# Patient Record
Sex: FEMALE | Race: WHITE | NOT HISPANIC OR LATINO | URBAN - METROPOLITAN AREA
[De-identification: names, ages, dates, MRNs, and addresses within clinical notes are randomized per-mention and may not be internally consistent; named-entity substitution may affect disease eponyms.]

---

## 2017-09-29 PROBLEM — L65.9 HAIR LOSS: Status: ACTIVE | Noted: 2017-09-29

## 2017-09-29 PROBLEM — E53.8 B12 DEFICIENCY: Status: ACTIVE | Noted: 2017-09-29

## 2017-09-29 PROBLEM — N94.6 DYSMENORRHEA: Status: ACTIVE | Noted: 2017-09-29

## 2017-09-29 PROBLEM — E03.9 HYPOTHYROIDISM, UNSPECIFIED TYPE: Status: ACTIVE | Noted: 2017-09-29

## 2017-09-29 PROBLEM — D50.9 IRON DEFICIENCY ANEMIA, UNSPECIFIED IRON DEFICIENCY ANEMIA TYPE: Status: ACTIVE | Noted: 2017-09-29

## 2017-09-29 PROCEDURE — 82607 VITAMIN B-12: CPT

## 2017-09-29 PROCEDURE — 84480 ASSAY TRIIODOTHYRONINE (T3): CPT

## 2017-09-29 PROCEDURE — 82746 ASSAY OF FOLIC ACID SERUM: CPT

## 2018-03-16 PROBLEM — G44.209 TENSION HEADACHE: Status: ACTIVE | Noted: 2018-03-16

## 2018-03-16 PROBLEM — R05.9 COUGH: Status: ACTIVE | Noted: 2018-03-16

## 2018-03-16 PROBLEM — J01.00 ACUTE NON-RECURRENT MAXILLARY SINUSITIS: Status: ACTIVE | Noted: 2018-03-16

## 2018-03-16 PROBLEM — H92.09 EARACHE: Status: ACTIVE | Noted: 2018-03-16

## 2018-03-16 PROBLEM — H10.33 ACUTE CONJUNCTIVITIS OF BOTH EYES, UNSPECIFIED ACUTE CONJUNCTIVITIS TYPE: Status: ACTIVE | Noted: 2018-03-16

## 2018-04-25 PROBLEM — M53.3 SACROILIAC JOINT DYSFUNCTION: Status: ACTIVE | Noted: 2018-04-25

## 2018-07-07 PROBLEM — R51.9 DAILY HEADACHE: Status: ACTIVE | Noted: 2018-07-07

## 2018-07-07 PROBLEM — F07.81 POSTCONCUSSION SYNDROME: Status: ACTIVE | Noted: 2018-07-07

## 2018-08-31 PROCEDURE — 88175 CYTOPATH C/V AUTO FLUID REDO: CPT | Performed by: OBSTETRICS & GYNECOLOGY

## 2018-09-10 PROCEDURE — 87086 URINE CULTURE/COLONY COUNT: CPT | Performed by: FAMILY MEDICINE

## 2019-03-26 ENCOUNTER — WALK IN (OUTPATIENT)
Dept: URGENT CARE | Age: 24
End: 2019-03-26

## 2019-04-16 ENCOUNTER — APPOINTMENT (OUTPATIENT)
Dept: OTHER | Facility: HOSPITAL | Age: 24
End: 2019-04-16
Attending: PREVENTIVE MEDICINE

## 2020-11-02 ENCOUNTER — APPOINTMENT (OUTPATIENT)
Dept: OTHER | Facility: HOSPITAL | Age: 25
End: 2020-11-02
Attending: PREVENTIVE MEDICINE

## 2021-06-08 ENCOUNTER — OFFICE VISIT (OUTPATIENT)
Dept: OTHER | Facility: HOSPITAL | Age: 26
End: 2021-06-08
Attending: ORTHOPAEDIC SURGERY

## 2021-06-08 DIAGNOSIS — Z00.00 ROUTINE GENERAL MEDICAL EXAMINATION AT A HEALTH CARE FACILITY: Primary | ICD-10-CM

## 2021-06-08 PROCEDURE — 87389 HIV-1 AG W/HIV-1&-2 AB AG IA: CPT

## 2021-06-08 PROCEDURE — 86803 HEPATITIS C AB TEST: CPT

## 2021-06-08 PROCEDURE — 86706 HEP B SURFACE ANTIBODY: CPT

## 2021-08-03 ENCOUNTER — OFFICE VISIT (OUTPATIENT)
Dept: OBGYN CLINIC | Facility: CLINIC | Age: 26
End: 2021-08-03
Payer: COMMERCIAL

## 2021-08-03 DIAGNOSIS — Z01.419 ENCOUNTER FOR WELL WOMAN EXAM: Primary | ICD-10-CM

## 2021-08-03 DIAGNOSIS — Z11.3 ROUTINE SCREENING FOR STI (SEXUALLY TRANSMITTED INFECTION): ICD-10-CM

## 2021-08-03 DIAGNOSIS — Z30.41 ENCOUNTER FOR BIRTH CONTROL PILLS MAINTENANCE: ICD-10-CM

## 2021-08-03 PROCEDURE — 99385 PREV VISIT NEW AGE 18-39: CPT | Performed by: NURSE PRACTITIONER

## 2021-08-03 RX ORDER — VITAMIN B COMPLEX
CAPSULE ORAL
COMMUNITY

## 2021-08-03 RX ORDER — LEVONORGESTREL AND ETHINYL ESTRADIOL AND ETHINYL ESTRADIOL 150-30(84)
1 KIT ORAL DAILY
COMMUNITY
Start: 2021-06-24 | End: 2021-08-03

## 2021-08-03 RX ORDER — FERROUS SULFATE 325(65) MG
325 TABLET ORAL 2 TIMES DAILY
COMMUNITY
Start: 2020-10-15

## 2021-08-03 RX ORDER — LEVONORGESTREL AND ETHINYL ESTRADIOL AND ETHINYL ESTRADIOL 150-30(84)
1 KIT ORAL DAILY
Qty: 91 TABLET | Refills: 3 | Status: SHIPPED | OUTPATIENT
Start: 2021-08-03

## 2021-08-03 RX ORDER — CALCIUM/MAGNESIUM/ZINC 333-133 MG
TABLET ORAL
COMMUNITY

## 2021-08-03 NOTE — PROGRESS NOTES
[address] [facility]  Obstetrics and Gynecology  Annual Gynecology Visit  Amarjit Cooper, MSN, APRN, FNP-BC    HPI   Tigist Monday is a 32year old female who presents for her annual gynecology exam and birth control refills. She has no complaints.  Menses are Other (Cervical Cancer) Mother    • Macular degeneration Maternal Aunt    • Retinal detachment Maternal Aunt    • Diabetes Paternal Grandmother       Social History:   Social History    Tobacco Use      Smoking status: Never Smoker      Smokeless tobacco: tenderness, bleeding or lesions. Cervix: Friability present. No cervical motion tenderness, discharge or lesion. Uterus: Normal. Not enlarged and not tender. Adnexa:         Right: No mass, tenderness or fullness.           Left: No mass, te HCV Antibody      Hepatitis B Surface Antigen      HIV Ag/Ab Combo      T Pallidum Screening [place]      ThinPrep PAP with HPV Reflex Request B      Chlamydia/GC PCR Combo      Genital vaginosis screen      PRESCRIPTIONS     Requested Prescriptions

## 2021-08-05 ENCOUNTER — LAB ENCOUNTER (OUTPATIENT)
Dept: LAB | Age: 26
End: 2021-08-05
Attending: NURSE PRACTITIONER
Payer: COMMERCIAL

## 2021-08-05 DIAGNOSIS — Z11.3 ROUTINE SCREENING FOR STI (SEXUALLY TRANSMITTED INFECTION): ICD-10-CM

## 2021-08-05 LAB
C TRACH DNA SPEC QL NAA+PROBE: NEGATIVE
GENITAL VAGINOSIS SCREEN: NEGATIVE
HBV SURFACE AG SER-ACNC: 0.12 [IU]/L
HBV SURFACE AG SERPL QL IA: NONREACTIVE
HCV AB SERPL QL IA: NONREACTIVE
N GONORRHOEA DNA SPEC QL NAA+PROBE: NEGATIVE
TRICHOMONAS SCREEN: NEGATIVE

## 2021-08-05 PROCEDURE — 36415 COLL VENOUS BLD VENIPUNCTURE: CPT

## 2021-08-05 PROCEDURE — 86780 TREPONEMA PALLIDUM: CPT

## 2021-08-05 PROCEDURE — 87389 HIV-1 AG W/HIV-1&-2 AB AG IA: CPT

## 2021-08-05 PROCEDURE — 87340 HEPATITIS B SURFACE AG IA: CPT

## 2021-08-05 PROCEDURE — 86803 HEPATITIS C AB TEST: CPT

## 2021-08-06 LAB — T PALLIDUM AB SER QL: NEGATIVE

## 2021-08-09 LAB — LAST PAP RESULT: NORMAL

## 2021-12-09 ENCOUNTER — IMMUNIZATION (OUTPATIENT)
Dept: LAB | Facility: HOSPITAL | Age: 26
End: 2021-12-09
Attending: EMERGENCY MEDICINE
Payer: COMMERCIAL

## 2021-12-09 DIAGNOSIS — Z23 NEED FOR VACCINATION: Primary | ICD-10-CM

## 2021-12-09 PROCEDURE — 0004A SARSCOV2 VAC 30MCG/0.3ML IM: CPT

## 2021-12-16 ENCOUNTER — LAB ENCOUNTER (OUTPATIENT)
Dept: LAB | Facility: HOSPITAL | Age: 26
End: 2021-12-16
Attending: PREVENTIVE MEDICINE
Payer: COMMERCIAL

## 2021-12-16 ENCOUNTER — TELEPHONE (OUTPATIENT)
Dept: INTERNAL MEDICINE CLINIC | Facility: HOSPITAL | Age: 26
End: 2021-12-16

## 2021-12-16 DIAGNOSIS — Z20.822 SUSPECTED COVID-19 VIRUS INFECTION: Primary | ICD-10-CM

## 2021-12-16 DIAGNOSIS — Z20.822 SUSPECTED COVID-19 VIRUS INFECTION: ICD-10-CM

## 2021-12-16 NOTE — TELEPHONE ENCOUNTER
Results and RTW guidelines:    COVID RESULT:    [x] Viewed by employee in [employer]. RTW plan and instructions as indicated on triage call. Manager notified. Estimated RTW date:   [] Discussed with employee   [] Unable to reach by phone.   Sent via [employer] for COVID    5.  Still has a fever, vomiting or diarrhea   - Keep communication open with management about RTW and if symptoms worsen                - If outside testing completed, bring a copy of result to RTW appointment           Notes:     RTW PLAN:

## 2021-12-16 NOTE — TELEPHONE ENCOUNTER
Department: [facility]                               [] [address]  []ERIC   [x] [address] [facility]    Dept Manager/Supervisor/team or clinical lead: Trever Whittaker    Position:  [] MD     [] RN     [] Respiratory Therapist     [] PCT     [] PSR      [] Soy Dockery     [] MA [x] Other: Ph scheduled to work? 12/17/2021    Did you have close contact with someone on your unit while not wearing a mask? (e.g., during meal breaks):  Yes []   No [x]    If yes, who:   Do you share a workspace? Yes []   No [x]       If yes, with whom?    Do you have ordered: [x] Rapid    [] [employer]    Date test is to be taken:  12/16/2021    []  Outside testing       [x] Manager notified    INSTRUCTIONS PROVIDED:    [x] Employee will schedule testing via [employer] or call Central Scheduling at [phone].   Instructed

## 2022-03-16 ENCOUNTER — OFFICE VISIT (OUTPATIENT)
Dept: INTERNAL MEDICINE CLINIC | Facility: CLINIC | Age: 27
End: 2022-03-16
Payer: COMMERCIAL

## 2022-03-16 VITALS
OXYGEN SATURATION: 98 % | HEART RATE: 67 BPM | SYSTOLIC BLOOD PRESSURE: 118 MMHG | HEIGHT: 67 IN | RESPIRATION RATE: 15 BRPM | DIASTOLIC BLOOD PRESSURE: 64 MMHG | WEIGHT: 138 LBS | BODY MASS INDEX: 21.66 KG/M2

## 2022-03-16 DIAGNOSIS — R53.83 OTHER FATIGUE: ICD-10-CM

## 2022-03-16 DIAGNOSIS — R68.89 COLD INTOLERANCE: ICD-10-CM

## 2022-03-16 DIAGNOSIS — E55.9 VITAMIN D INSUFFICIENCY: ICD-10-CM

## 2022-03-16 DIAGNOSIS — F43.21 GRIEF: ICD-10-CM

## 2022-03-16 DIAGNOSIS — Z83.49 FAMILY HISTORY OF THYROID DISEASE: ICD-10-CM

## 2022-03-16 DIAGNOSIS — Z00.00 PREVENTATIVE HEALTH CARE: Primary | ICD-10-CM

## 2022-03-16 PROCEDURE — 3074F SYST BP LT 130 MM HG: CPT | Performed by: FAMILY MEDICINE

## 2022-03-16 PROCEDURE — 3078F DIAST BP <80 MM HG: CPT | Performed by: FAMILY MEDICINE

## 2022-03-16 PROCEDURE — 3008F BODY MASS INDEX DOCD: CPT | Performed by: FAMILY MEDICINE

## 2022-03-16 PROCEDURE — 99385 PREV VISIT NEW AGE 18-39: CPT | Performed by: FAMILY MEDICINE

## 2022-03-19 ENCOUNTER — LAB ENCOUNTER (OUTPATIENT)
Dept: LAB | Age: 27
End: 2022-03-19
Attending: FAMILY MEDICINE
Payer: COMMERCIAL

## 2022-03-19 DIAGNOSIS — E55.9 VITAMIN D INSUFFICIENCY: ICD-10-CM

## 2022-03-19 DIAGNOSIS — R53.83 OTHER FATIGUE: ICD-10-CM

## 2022-03-19 LAB
ANION GAP SERPL CALC-SCNC: 7 MMOL/L (ref 0–18)
BUN BLD-MCNC: 13 MG/DL (ref 7–18)
BUN/CREAT SERPL: 15.1 (ref 10–20)
CALCIUM BLD-MCNC: 9.2 MG/DL (ref 8.5–10.1)
CHLORIDE SERPL-SCNC: 109 MMOL/L (ref 98–112)
CO2 SERPL-SCNC: 25 MMOL/L (ref 21–32)
CREAT BLD-MCNC: 0.86 MG/DL
DEPRECATED RDW RBC AUTO: 43.4 FL (ref 35.1–46.3)
ERYTHROCYTE [DISTWIDTH] IN BLOOD BY AUTOMATED COUNT: 12.5 % (ref 11–15)
GLUCOSE BLD-MCNC: 92 MG/DL (ref 70–99)
HCT VFR BLD AUTO: 41.2 %
HGB BLD-MCNC: 13.6 G/DL
MCH RBC QN AUTO: 31 PG (ref 26–34)
MCHC RBC AUTO-ENTMCNC: 33 G/DL (ref 31–37)
MCV RBC AUTO: 93.8 FL
OSMOLALITY SERPL CALC.SUM OF ELEC: 292 MOSM/KG (ref 275–295)
PLATELET # BLD AUTO: 239 10(3)UL (ref 150–450)
POTASSIUM SERPL-SCNC: 4.2 MMOL/L (ref 3.5–5.1)
RBC # BLD AUTO: 4.39 X10(6)UL
SODIUM SERPL-SCNC: 141 MMOL/L (ref 136–145)
TSI SER-ACNC: 1.05 MIU/ML (ref 0.36–3.74)
VIT D+METAB SERPL-MCNC: 12.2 NG/ML (ref 30–100)
WBC # BLD AUTO: 5.5 X10(3) UL (ref 4–11)

## 2022-03-19 PROCEDURE — 80048 BASIC METABOLIC PNL TOTAL CA: CPT

## 2022-03-19 PROCEDURE — 84443 ASSAY THYROID STIM HORMONE: CPT

## 2022-03-19 PROCEDURE — 85027 COMPLETE CBC AUTOMATED: CPT

## 2022-03-19 PROCEDURE — 36415 COLL VENOUS BLD VENIPUNCTURE: CPT

## 2022-03-19 PROCEDURE — 82306 VITAMIN D 25 HYDROXY: CPT

## 2022-03-26 RX ORDER — ERGOCALCIFEROL 1.25 MG/1
50000 CAPSULE ORAL WEEKLY
Qty: 16 CAPSULE | Refills: 0 | Status: SHIPPED | OUTPATIENT
Start: 2022-03-26 | End: 2022-04-25

## 2022-04-07 ENCOUNTER — NURSE ONLY (OUTPATIENT)
Dept: INTERNAL MEDICINE CLINIC | Facility: CLINIC | Age: 27
End: 2022-04-07
Payer: COMMERCIAL

## 2022-04-07 DIAGNOSIS — Z01.84 IMMUNITY STATUS TESTING: ICD-10-CM

## 2022-04-07 LAB
HBV CORE AB SERPL QL IA: NONREACTIVE
HBV SURFACE AB SER QL: REACTIVE
HBV SURFACE AB SERPL IA-ACNC: 758.52 MIU/ML
HBV SURFACE AG SER-ACNC: <0.1 [IU]/L
HBV SURFACE AG SERPL QL IA: NONREACTIVE
RUBV IGG SER QL: POSITIVE
RUBV IGG SER-ACNC: 235.9 IU/ML (ref 10–?)

## 2022-04-07 PROCEDURE — 86787 VARICELLA-ZOSTER ANTIBODY: CPT | Performed by: FAMILY MEDICINE

## 2022-04-07 PROCEDURE — 87340 HEPATITIS B SURFACE AG IA: CPT | Performed by: FAMILY MEDICINE

## 2022-04-07 PROCEDURE — 80503 PATH CLIN CONSLTJ SF 5-20: CPT | Performed by: FAMILY MEDICINE

## 2022-04-07 PROCEDURE — 86706 HEP B SURFACE ANTIBODY: CPT | Performed by: FAMILY MEDICINE

## 2022-04-07 PROCEDURE — 86735 MUMPS ANTIBODY: CPT | Performed by: FAMILY MEDICINE

## 2022-04-07 PROCEDURE — 86704 HEP B CORE ANTIBODY TOTAL: CPT | Performed by: FAMILY MEDICINE

## 2022-04-07 PROCEDURE — 86765 RUBEOLA ANTIBODY: CPT | Performed by: FAMILY MEDICINE

## 2022-04-07 PROCEDURE — 86762 RUBELLA ANTIBODY: CPT | Performed by: FAMILY MEDICINE

## 2022-04-08 LAB
MEV IGG SER-ACNC: >300 AU/ML (ref 16.5–?)
MUV IGG SER IA-ACNC: 75.9 AU/ML (ref 11–?)
VZV IGG SER IA-ACNC: 773.3 (ref 165–?)

## 2022-04-15 ENCOUNTER — APPOINTMENT (OUTPATIENT)
Dept: GENERAL RADIOLOGY | Age: 27
End: 2022-04-15
Attending: NURSE PRACTITIONER
Payer: COMMERCIAL

## 2022-04-15 ENCOUNTER — HOSPITAL ENCOUNTER (OUTPATIENT)
Age: 27
Discharge: HOME | End: 2022-04-15
Payer: COMMERCIAL

## 2022-04-15 VITALS
TEMPERATURE: 99 F | SYSTOLIC BLOOD PRESSURE: 138 MMHG | HEART RATE: 97 BPM | RESPIRATION RATE: 22 BRPM | DIASTOLIC BLOOD PRESSURE: 87 MMHG | OXYGEN SATURATION: 100 %

## 2022-04-15 DIAGNOSIS — S83.92XA SPRAIN OF LEFT KNEE, UNSPECIFIED LIGAMENT, INITIAL ENCOUNTER: Primary | ICD-10-CM

## 2022-04-15 PROCEDURE — 73562 X-RAY EXAM OF KNEE 3: CPT | Performed by: NURSE PRACTITIONER

## 2022-04-15 PROCEDURE — L1830 KO IMMOB CANVAS LONG PRE OTS: HCPCS | Performed by: NURSE PRACTITIONER

## 2022-04-15 PROCEDURE — 99203 OFFICE O/P NEW LOW 30 MIN: CPT | Performed by: NURSE PRACTITIONER

## 2022-04-15 NOTE — ED INITIAL ASSESSMENT (HPI)
PT STATES SHE RAN INTO A BENCH AND INJURED  BILATERAL WHILE RUNNING , CLICKING , PAIN ON/OFF STARTED 4/10/22

## 2022-04-25 ENCOUNTER — OFFICE VISIT (OUTPATIENT)
Dept: ORTHOPEDICS CLINIC | Facility: CLINIC | Age: 27
End: 2022-04-25
Payer: COMMERCIAL

## 2022-04-25 VITALS — WEIGHT: 140 LBS | HEIGHT: 67 IN | BODY MASS INDEX: 21.97 KG/M2

## 2022-04-25 DIAGNOSIS — M22.41 CHONDROMALACIA, PATELLA, RIGHT: Primary | ICD-10-CM

## 2022-04-25 DIAGNOSIS — M22.42 CHONDROMALACIA, PATELLA, LEFT: ICD-10-CM

## 2022-04-26 ENCOUNTER — TELEPHONE (OUTPATIENT)
Dept: PHYSICAL THERAPY | Facility: HOSPITAL | Age: 27
End: 2022-04-26

## 2022-04-28 ENCOUNTER — OFFICE VISIT (OUTPATIENT)
Dept: PHYSICAL THERAPY | Age: 27
End: 2022-04-28
Attending: ORTHOPAEDIC SURGERY
Payer: COMMERCIAL

## 2022-04-28 DIAGNOSIS — M22.41 CHONDROMALACIA, PATELLA, RIGHT: ICD-10-CM

## 2022-04-28 DIAGNOSIS — M22.42 CHONDROMALACIA, PATELLA, LEFT: ICD-10-CM

## 2022-04-28 PROCEDURE — 97162 PT EVAL MOD COMPLEX 30 MIN: CPT | Performed by: PHYSICAL THERAPIST

## 2022-04-28 PROCEDURE — 97110 THERAPEUTIC EXERCISES: CPT | Performed by: PHYSICAL THERAPIST

## 2022-04-28 NOTE — PROGRESS NOTES
LOWER EXTREMITY EVALUATION:   Referring Physician: Dr. 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 She also report mid lower back pain. She denies any numbness or tingling at this time. 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 She enjoys walking her dog for a few blocks. She also like to run, hike, bike, go to museums, and watch TV sitting on a couch. History of current condition:  Connie Muniz report that she started feeling (L>R) knee pain/ache for no apparent reason about 2 weeks ago. She state that her pain is better but not gone. She also report that she has been having difficulty doing her normal activities. She is also feeling lower back pain which also contributes to her difficulty with activities. She is now referred to physical therapy for evaluation and treatment. Current functional limitation reported include inability to bend down, rise up from sitting, walking, climb stairs (down > up), kneel, squat, drive, get in/out car, and lay down on her side (R) without producing or increasing symptoms in (B) knees. Patient would like to to return to their previous level. ASSESSMENT:   Connie Muniz is presenting with (B) knee derangement with a directional preference toward seated knee extension with heel on the floor and self overpressure. She was able to perform a deep squat with (B) knees without discomfort and with full range of motion. She was explained the importance to performing her directional preference exercise consistently to promote continuous improvement until her next session.   She will be re-assessed and will be progressed according to her presentation. She understood and agreed with the treatment plan and goal setting. All questions and concerns were answered and addressed at this time. Zeeshan Khanna would benefit from skilled Physical Therapy to address the above impairments. Precautions:  None     OBJECTIVE:   Observation: (+) (L>R)  Genu valgum with deep squat;  (-) Tenderness;  (-) Warmth; (-) Swelling    Gait: Normal.    ROM/MMT:  (Pre-test symptom: 0/10 (B) knees)  Knee   Flexion:  (L) 138 degs/ 3+/5;  (R) 139 degs/ 4-/5  Extension:  (L) 4 degs/ 4-/5;  (R) 4 degs/ 4-/5         Girth measurement:  Knee jointline: (L) 38.5 cm,  (R) 38.5 cm    Patient education and instructions:   Patient education provided on derangement principle, directional preference exercise, establishing a test motion to assess improvement, goal setting with patient, and HEP. Patient was instructed in and issued HEP for: (L/R) Knee seated repeated extension with heel on floor with added self overpressure 10 reps x 10 cts ea 4-6x/day;  Alignment/posture correction knee-toe during deep squat test motion;  Establishing a test motion (ie (B) knee squat) to compare symptoms before and after exercise to assess improvement. Charges: PT Eval x1, TherE x 2      Total Timed Treatment: 25 mins     Total Treatment Time: 54 mins      PLAN OF CARE:    Goals:  (12 visits)  1. Patient to consistently perform their HEP and it's progression to maintain their improved condition. 2. Patient to have reduced and abolished symptoms to be able to bend down, rise up from sitting, walking, climb stairs (down > up), kneel, squat, drive, get in/out car, and lay down on her side (R) without producing or increasing symptoms in (B) knees. 3. Patient to have WNL of (B) knee2 AROM in all directions with 4-5/5 MMT in all motions to promote all home, work, recreational, and functional activities without discomfort or deviation.      Patient was advised of these findings, precautions, and treatment options and has agreed to actively participate in planning/goal setting for this course of care. Frequency / Duration: Patient will be seen for 2-1 x/week or a total of 12 visits over a 90 day period. Treatment will include: Directional preference exercises, Manual Therapy; Therapeutic Exercises; Neuromuscular Re-education; Gait Training; Electrical Stim; Patient education; Home exercise program instruction. Education or treatment limitation: None  Rehab Potential:good    In agreement with FOTO score and clinical rationale, this evaluation involved Moderate Complexity decision making due to 1-2 personal factors/comorbidities, 4+ body structures involved/activity limitations, and evolving symptoms including changing pain levels. FOTO: Initial:  56/100;  Goal: 78/100    Patient was advised of these findings, precautions, and treatment options and has agreed to actively participate in planning and for this course of care. Thank you for your referral. Please co-sign or sign and return this letter via fax as soon as possible to [phone]. If you have any questions, please contact me at Dept: [phone]    Sincerely,  Electronically signed by therapist: Lew Villeda PT Cert MDT    Physician's certification required: Yes  I certify the need for these services furnished under this plan of treatment and while under my care.     X___________________________________________________ Date____________________    Certification From: 8/82/5440  To:7/27/2022

## 2022-04-28 NOTE — PATIENT INSTRUCTIONS
Patient was instructed in and issued HEP for: (L/R) Knee seated repeated extension with heel on floor with added self overpressure 10 reps x 10 cts ea 4-6x/day;  Alignment/posture correction knee-toe during deep squat test motion;  Establishing a test motion (ie (B) knee squat) to compare symptoms before and after exercise to assess improvement.

## 2022-05-03 ENCOUNTER — OFFICE VISIT (OUTPATIENT)
Dept: PHYSICAL THERAPY | Age: 27
End: 2022-05-03
Attending: ORTHOPAEDIC SURGERY
Payer: COMMERCIAL

## 2022-05-03 PROCEDURE — 97110 THERAPEUTIC EXERCISES: CPT | Performed by: PHYSICAL THERAPIST

## 2022-05-05 ENCOUNTER — APPOINTMENT (OUTPATIENT)
Dept: PHYSICAL THERAPY | Age: 27
End: 2022-05-05
Attending: ORTHOPAEDIC SURGERY
Payer: COMMERCIAL

## 2022-05-10 ENCOUNTER — TELEPHONE (OUTPATIENT)
Dept: PHYSICAL THERAPY | Facility: HOSPITAL | Age: 27
End: 2022-05-10

## 2022-05-10 ENCOUNTER — APPOINTMENT (OUTPATIENT)
Dept: PHYSICAL THERAPY | Age: 27
End: 2022-05-10
Attending: ORTHOPAEDIC SURGERY
Payer: COMMERCIAL

## 2022-05-13 ENCOUNTER — NURSE ONLY (OUTPATIENT)
Dept: INTERNAL MEDICINE CLINIC | Facility: CLINIC | Age: 27
End: 2022-05-13
Payer: COMMERCIAL

## 2022-05-13 DIAGNOSIS — Z11.1 SCREENING FOR TUBERCULOSIS: ICD-10-CM

## 2022-05-13 PROCEDURE — 86480 TB TEST CELL IMMUN MEASURE: CPT | Performed by: FAMILY MEDICINE

## 2022-05-16 LAB
M TB IFN-G CD4+ T-CELLS BLD-ACNC: 0.01 IU/ML
M TB TUBERC IFN-G BLD QL: NEGATIVE
M TB TUBERC IGNF/MITOGEN IGNF CONTROL: >10 IU/ML
QFT TB1 AG MINUS NIL: 0 IU/ML
QFT TB2 AG MINUS NIL: 0.02 IU/ML

## 2022-05-18 ENCOUNTER — APPOINTMENT (OUTPATIENT)
Dept: OTHER | Facility: HOSPITAL | Age: 27
End: 2022-05-18
Attending: EMERGENCY MEDICINE

## 2022-05-18 RX ORDER — LEVONORGESTREL AND ETHINYL ESTRADIOL AND ETHINYL ESTRADIOL 150-30(84)
1 KIT ORAL DAILY
Qty: 91 TABLET | Refills: 0 | Status: SHIPPED | OUTPATIENT
Start: 2022-05-18

## 2022-05-19 ENCOUNTER — OFFICE VISIT (OUTPATIENT)
Dept: PHYSICAL THERAPY | Age: 27
End: 2022-05-19
Attending: ORTHOPAEDIC SURGERY
Payer: COMMERCIAL

## 2022-05-19 PROCEDURE — 97110 THERAPEUTIC EXERCISES: CPT | Performed by: PHYSICAL THERAPIST

## 2022-05-24 ENCOUNTER — OFFICE VISIT (OUTPATIENT)
Dept: PHYSICAL THERAPY | Age: 27
End: 2022-05-24
Attending: ORTHOPAEDIC SURGERY
Payer: COMMERCIAL

## 2022-05-24 PROCEDURE — 97110 THERAPEUTIC EXERCISES: CPT | Performed by: PHYSICAL THERAPIST

## 2022-05-25 ENCOUNTER — OFFICE VISIT (OUTPATIENT)
Dept: PHYSICAL THERAPY | Age: 27
End: 2022-05-25
Attending: ORTHOPAEDIC SURGERY
Payer: COMMERCIAL

## 2022-05-25 PROCEDURE — 97110 THERAPEUTIC EXERCISES: CPT | Performed by: PHYSICAL THERAPIST

## 2022-05-27 ENCOUNTER — APPOINTMENT (OUTPATIENT)
Dept: PHYSICAL THERAPY | Age: 27
End: 2022-05-27
Attending: ORTHOPAEDIC SURGERY
Payer: COMMERCIAL

## 2022-05-31 ENCOUNTER — APPOINTMENT (OUTPATIENT)
Dept: PHYSICAL THERAPY | Age: 27
End: 2022-05-31
Attending: ORTHOPAEDIC SURGERY
Payer: COMMERCIAL

## 2022-05-31 NOTE — PROGRESS NOTES
and full name     Blood collected successfully via venipuncture     Patient tolerated draw well Ayesha Belle

## 2023-05-16 ENCOUNTER — APPOINTMENT (OUTPATIENT)
Dept: OTHER | Facility: HOSPITAL | Age: 28
End: 2023-05-16
Attending: PREVENTIVE MEDICINE

## 2023-08-09 ENCOUNTER — APPOINTMENT (OUTPATIENT)
Dept: URGENT CARE | Age: 28
End: 2023-08-09

## 2024-04-26 ENCOUNTER — HOSPITAL ENCOUNTER (OUTPATIENT)
Age: 29
Discharge: HOME | End: 2024-04-26
Payer: COMMERCIAL

## 2024-04-26 VITALS
HEIGHT: 67 IN | TEMPERATURE: 98 F | OXYGEN SATURATION: 99 % | WEIGHT: 140 LBS | SYSTOLIC BLOOD PRESSURE: 126 MMHG | RESPIRATION RATE: 18 BRPM | HEART RATE: 109 BPM | DIASTOLIC BLOOD PRESSURE: 90 MMHG | BODY MASS INDEX: 21.97 KG/M2

## 2024-04-26 DIAGNOSIS — S09.90XA INJURY OF HEAD, INITIAL ENCOUNTER: Primary | ICD-10-CM

## 2024-04-26 DIAGNOSIS — S39.012A BACK STRAIN, INITIAL ENCOUNTER: ICD-10-CM

## 2024-04-26 PROCEDURE — 99213 OFFICE O/P EST LOW 20 MIN: CPT | Performed by: NURSE PRACTITIONER

## 2024-04-26 RX ORDER — CYCLOBENZAPRINE HCL 10 MG
10 TABLET ORAL 3 TIMES DAILY PRN
Qty: 20 TABLET | Refills: 0 | Status: SHIPPED | OUTPATIENT
Start: 2024-04-26 | End: 2024-05-03

## 2024-04-26 NOTE — ED PROVIDER NOTES
Patient Seen in: [facility]      History     Chief Complaint   Patient presents with    Fall    Head Injury     Stated Complaint: back pain  due to fall yesterday    Subjective:   HPI    28-year-old female presents today with complaints of headache and back pain after tripping in an elevator at [facility].  Patient states that she hit the backside of the left side of her head.  Patient states that she does have a concussion from 2018.  Patient states today different portions of her back hurt at different times.  Patient states that she is in clinicals for x-ray.    Objective:   Past Medical History:    Anemia    iron deficiency, menorrhagia    Dysmenorrhea treated with oral contraceptive    H/O concussion    History of elective     D&C    Vitamin D deficiency              Past Surgical History:   Procedure Laterality Date    D & c  2014    EAB    [place] teeth removed  2015                No pertinent social history.            Review of Systems   Constitutional: Negative.    HENT: Negative.     Eyes: Negative.    Respiratory: Negative.     Cardiovascular: Negative.    Gastrointestinal: Negative.    Endocrine: Negative.    Genitourinary: Negative.    Musculoskeletal:  Positive for back pain.   Skin: Negative.    Allergic/Immunologic: Negative.    Neurological: Negative.    Hematological: Negative.    Psychiatric/Behavioral: Negative.         Positive for stated complaint: back pain  due to fall yesterday  Other systems are as noted in HPI.  Constitutional and vital signs reviewed.      All other systems reviewed and negative except as noted above.    Physical Exam     ED Triage Vitals [24 1555]   /90   Pulse 109   Resp 18   Temp 98.1 °F (36.7 °C)   Temp src    SpO2 99 %   O2 Device None (Room air)       Current:/90   Pulse 109   Temp 98.1 °F (36.7 °C)   Resp 18   Ht 170.2 cm (5' 7\")   Wt 63.5 kg   [facility] 2024   SpO2 99%   BMI 21.93 kg/m²          Physical Exam  Vitals and nursing note reviewed.   Constitutional:       Appearance: Normal appearance. She is normal weight.   HENT:      Head: Normocephalic.      Right Ear: Tympanic membrane, ear canal and external ear normal.      Left Ear: Tympanic membrane, ear canal and external ear normal.      Nose: Nose normal.      Mouth/Throat:      Mouth: Mucous membranes are moist.      Pharynx: Oropharynx is clear.   Eyes:      Extraocular Movements: Extraocular movements intact.      Conjunctiva/sclera: Conjunctivae normal.      Pupils: Pupils are equal, round, and reactive to light.   Neck:      Comments: Bilateral upper  strength 2+ equal.  Cardiovascular:      Rate and Rhythm: Normal rate and regular rhythm.      Pulses: Normal pulses.      Heart sounds: Normal heart sounds.   Pulmonary:      Effort: Pulmonary effort is normal.      Breath sounds: Normal breath sounds.   Musculoskeletal:         General: Tenderness present. Normal range of motion.      Cervical back: Normal range of motion and neck supple.      Comments: Slight tenderness elicited with palpation over the paraspinal thoracic region and paraspinal lumbar region.  No obvious deformities or ecchymotic lesions noted.   Skin:     Capillary Refill: Capillary refill takes less than 2 seconds.   Neurological:      General: No focal deficit present.      Mental Status: She is alert.   Psychiatric:         Mood and Affect: Mood normal.             ED Course   Labs Reviewed - No data to display                   MDM      28-year-old female presents today with complaints of headache and back pain after tripping in an elevator at [facility].  Patient states that she hit the backside of the left side of her head.  Patient states that she does have a concussion from 2018.  Patient states today different portions of her back hurt at different times.  Patient states that she is in clinicals for x-ray.  Vital signs: Please see EMR.  Physical exam: Please  see exam.  Differential diagnosis: Concussion, myalgia, back strain, head injury.  Based on physical exam and HPI will diagnosed with head injury and back strain.  Will prescribe muscle relaxer as needed.  Patient is to take over-the-counter Tylenol as needed.  ED precautions given.  Note to Patient  The 21st Century Cures Act makes medical notes like these available to patients in the interest of transparency. However, be advised this is a medical document and is intended as [id number] communication; it is written in medical language and may appear blunt, direct, or contain abbreviations or verbiage that are unfamiliar. Medical documents are intended to carry relevant information, facts as evident, and the clinical opinion of the practitioner.     This report has been produced using speech recognition software, and may contain errors related to grammar, punctuation, spelling, words or phrases unrecognized or not translated appropriately to text; these errors may be referred to the dictating provider for further clarification and/or addendum as needed.                                     Medical Decision Making  28-year-old female presents today with complaints of headache and back pain after tripping in an elevator at [facility].  Patient states that she hit the backside of the left side of her head.  Patient states that she does have a concussion from 2018.  Patient states today different portions of her back hurt at different times.  Patient states that she is in clinicals for x-ray.    Problems Addressed:  Back strain, initial encounter: acute illness or injury  Injury of head, initial encounter: acute illness or injury    Risk  Prescription drug management.        Disposition and Plan     Clinical Impression:  1. Injury of head, initial encounter    2. Back strain, initial encounter         Disposition:  Discharge  4/26/2024  4:05 pm    Follow-up:  Yusra Stanley MD  [address] [place] [address]  [id number]  [phone]    In 1 week  Urgent care follow up          Medications Prescribed:  Current Discharge Medication List        START taking these medications    Details   cyclobenzaprine 10 MG Oral Tab Take 1 tablet (10 mg total) by mouth 3 (three) times daily as needed for Muscle spasms.  Qty: 20 tablet, Refills: 0

## 2024-04-26 NOTE — ED INITIAL ASSESSMENT (HPI)
Pt c/o trip and fall hitting her head on an elevator wall. Pt denies loc. Pt c/o pain left side of her head and the right side of her lower neck/ shoulder/upper back.

## 2024-05-17 ENCOUNTER — OFFICE VISIT (OUTPATIENT)
Dept: OTHER | Facility: HOSPITAL | Age: 29
End: 2024-05-17
Attending: PREVENTIVE MEDICINE

## 2024-05-17 DIAGNOSIS — Z02.89 VISIT FOR OCCUPATIONAL HEALTH EXAMINATION: Primary | ICD-10-CM

## 2024-05-17 PROCEDURE — 86480 TB TEST CELL IMMUN MEASURE: CPT

## 2024-05-20 LAB
M TB IFN-G CD4+ T-CELLS BLD-ACNC: 0.03 IU/ML
M TB TUBERC IFN-G BLD QL: NEGATIVE
M TB TUBERC IGNF/MITOGEN IGNF CONTROL: >10 IU/ML
QFT TB1 AG MINUS NIL: 0.02 IU/ML
QFT TB2 AG MINUS NIL: 0.01 IU/ML

## 2025-07-09 ENCOUNTER — APPOINTMENT (OUTPATIENT)
Dept: FAMILY MEDICINE | Age: 30
End: 2025-07-09

## 2025-07-16 ENCOUNTER — APPOINTMENT (OUTPATIENT)
Dept: FAMILY MEDICINE | Age: 30
End: 2025-07-16

## 2025-07-16 VITALS
SYSTOLIC BLOOD PRESSURE: 117 MMHG | WEIGHT: 144.62 LBS | OXYGEN SATURATION: 100 % | HEART RATE: 85 BPM | DIASTOLIC BLOOD PRESSURE: 87 MMHG | HEIGHT: 67 IN | RESPIRATION RATE: 16 BRPM | BODY MASS INDEX: 22.7 KG/M2 | TEMPERATURE: 98.3 F

## 2025-07-16 DIAGNOSIS — Z02.0 SCHOOL PHYSICAL EXAM: ICD-10-CM

## 2025-07-16 DIAGNOSIS — Z30.019 ENCOUNTER FOR FEMALE BIRTH CONTROL: ICD-10-CM

## 2025-07-16 DIAGNOSIS — Z00.00 ENCOUNTER FOR GENERAL ADULT MEDICAL EXAMINATION W/O ABNORMAL FINDINGS: ICD-10-CM

## 2025-07-16 DIAGNOSIS — M54.50 ACUTE LEFT-SIDED LOW BACK PAIN WITHOUT SCIATICA: Primary | ICD-10-CM

## 2025-07-16 RX ORDER — LEVONORGESTREL AND ETHINYL ESTRADIOL AND ETHINYL ESTRADIOL 150-30(84)
1 KIT ORAL DAILY
COMMUNITY
Start: 2025-06-23

## 2025-07-16 ASSESSMENT — PATIENT HEALTH QUESTIONNAIRE - PHQ9
SUM OF ALL RESPONSES TO PHQ9 QUESTIONS 1 AND 2: 0
2. FEELING DOWN, DEPRESSED OR HOPELESS: NOT AT ALL
CLINICAL INTERPRETATION OF PHQ2 SCORE: NO FURTHER SCREENING NEEDED
SUM OF ALL RESPONSES TO PHQ9 QUESTIONS 1 AND 2: 0
1. LITTLE INTEREST OR PLEASURE IN DOING THINGS: NOT AT ALL

## 2025-07-16 ASSESSMENT — ENCOUNTER SYMPTOMS
ABDOMINAL PAIN: 0
NUMBNESS: 0
WHEEZING: 0
DIAPHORESIS: 0
APPETITE CHANGE: 0
CHILLS: 0
SHORTNESS OF BREATH: 0
DIZZINESS: 0
COUGH: 0
SORE THROAT: 0
DIARRHEA: 0
NERVOUS/ANXIOUS: 0
FEVER: 0
BLOOD IN STOOL: 0
BACK PAIN: 1
WEAKNESS: 0
CONSTIPATION: 0

## 2025-07-21 ENCOUNTER — TELEPHONE (OUTPATIENT)
Dept: FAMILY MEDICINE | Age: 30
End: 2025-07-21

## 2025-07-21 DIAGNOSIS — M54.50 ACUTE LEFT-SIDED LOW BACK PAIN WITHOUT SCIATICA: Primary | ICD-10-CM

## 2025-07-22 RX ORDER — CYCLOBENZAPRINE HCL 5 MG
5 TABLET ORAL 2 TIMES DAILY PRN
Qty: 30 TABLET | Refills: 0 | Status: SHIPPED | OUTPATIENT
Start: 2025-07-22

## 2025-07-24 ENCOUNTER — APPOINTMENT (OUTPATIENT)
Dept: PHYSICAL THERAPY | Age: 30
End: 2025-07-24
Attending: HOSPITALIST

## 2025-07-24 DIAGNOSIS — M54.50 ACUTE LEFT-SIDED LOW BACK PAIN WITHOUT SCIATICA: Primary | ICD-10-CM

## 2025-07-24 ASSESSMENT — ENCOUNTER SYMPTOMS
PAIN FREQUENCY: INTERMITTENT
QUALITY: ACHE

## 2025-07-25 ENCOUNTER — APPOINTMENT (OUTPATIENT)
Age: 30
End: 2025-07-25
Attending: HOSPITALIST

## 2025-07-27 PROBLEM — M54.50 ACUTE LEFT-SIDED LOW BACK PAIN WITHOUT SCIATICA: Status: ACTIVE | Noted: 2025-07-27

## 2025-07-27 ASSESSMENT — ENCOUNTER SYMPTOMS
PAIN SEVERITY NOW: 7
ALLEVIATING FACTORS: UNABLE TO STATE ANYTHING THAT HELPS REDUCE THE PAIN
SUBJECTIVE PAIN PROGRESSION: IMPROVED
PAIN LOCATION: LUMBAR SPINE

## 2025-07-28 ENCOUNTER — APPOINTMENT (OUTPATIENT)
Dept: PHYSICAL THERAPY | Age: 30
End: 2025-07-28

## 2025-07-28 DIAGNOSIS — M54.50 ACUTE LEFT-SIDED LOW BACK PAIN WITHOUT SCIATICA: Primary | ICD-10-CM

## 2025-07-30 ENCOUNTER — E-ADVICE (OUTPATIENT)
Dept: PHYSICAL THERAPY | Age: 30
End: 2025-07-30

## 2025-07-31 ENCOUNTER — OFFICE VISIT (OUTPATIENT)
Dept: PHYSICAL THERAPY | Age: 30
End: 2025-07-31

## 2025-07-31 DIAGNOSIS — M54.50 ACUTE LEFT-SIDED LOW BACK PAIN WITHOUT SCIATICA: Primary | ICD-10-CM

## 2025-08-04 ENCOUNTER — APPOINTMENT (OUTPATIENT)
Dept: PHYSICAL THERAPY | Age: 30
End: 2025-08-04

## 2025-08-04 DIAGNOSIS — M54.50 ACUTE LEFT-SIDED LOW BACK PAIN WITHOUT SCIATICA: Primary | ICD-10-CM

## 2025-08-06 ENCOUNTER — APPOINTMENT (OUTPATIENT)
Dept: PHYSICAL THERAPY | Age: 30
End: 2025-08-06

## 2025-08-06 DIAGNOSIS — M54.50 ACUTE LEFT-SIDED LOW BACK PAIN WITHOUT SCIATICA: Primary | ICD-10-CM

## 2025-08-07 ENCOUNTER — E-ADVICE (OUTPATIENT)
Dept: FAMILY MEDICINE | Age: 30
End: 2025-08-07

## 2025-08-08 ENCOUNTER — LAB SERVICES (OUTPATIENT)
Dept: LAB | Age: 30
End: 2025-08-08

## 2025-08-08 DIAGNOSIS — Z02.0 SCHOOL PHYSICAL EXAM: ICD-10-CM

## 2025-08-08 DIAGNOSIS — Z00.00 ENCOUNTER FOR GENERAL ADULT MEDICAL EXAMINATION W/O ABNORMAL FINDINGS: ICD-10-CM

## 2025-08-08 LAB
ALBUMIN SERPL-MCNC: 3.7 G/DL (ref 3.4–5)
ALBUMIN/GLOB SERPL: 1.1 {RATIO} (ref 1–2.4)
ALP SERPL-CCNC: 52 UNITS/L (ref 45–117)
ALT SERPL-CCNC: 19 UNITS/L
ANION GAP SERPL CALC-SCNC: 15 MMOL/L (ref 7–19)
AST SERPL-CCNC: 9 UNITS/L
BASOPHILS # BLD: 0 K/MCL (ref 0–0.3)
BASOPHILS NFR BLD: 1 %
BILIRUB SERPL-MCNC: 0.5 MG/DL (ref 0.2–1)
BUN SERPL-MCNC: 14 MG/DL (ref 6–20)
BUN/CREAT SERPL: 16 (ref 7–25)
CALCIUM SERPL-MCNC: 9.3 MG/DL (ref 8.4–10.2)
CHLORIDE SERPL-SCNC: 103 MMOL/L (ref 97–110)
CHOLEST SERPL-MCNC: 185 MG/DL
CHOLEST/HDLC SERPL: 3.1 {RATIO}
CO2 SERPL-SCNC: 26 MMOL/L (ref 21–32)
CREAT SERPL-MCNC: 0.86 MG/DL (ref 0.51–0.95)
DEPRECATED RDW RBC: 41.9 FL (ref 39–50)
EGFRCR SERPLBLD CKD-EPI 2021: >90 ML/MIN/{1.73_M2}
EOSINOPHIL # BLD: 0.1 K/MCL (ref 0–0.5)
EOSINOPHIL NFR BLD: 2 %
ERYTHROCYTE [DISTWIDTH] IN BLOOD: 12.5 % (ref 11–15)
FASTING DURATION TIME PATIENT: NORMAL H
GLOBULIN SER-MCNC: 3.3 G/DL (ref 2–4)
GLUCOSE SERPL-MCNC: 82 MG/DL (ref 70–99)
HBV SURFACE AB SER QL: POSITIVE
HCT VFR BLD CALC: 38.5 % (ref 36–46.5)
HDLC SERPL-MCNC: 59 MG/DL
HGB BLD-MCNC: 12.7 G/DL (ref 12–15.5)
IMM GRANULOCYTES # BLD AUTO: 0 K/MCL (ref 0–0.2)
IMM GRANULOCYTES # BLD: 0 %
LDLC SERPL CALC-MCNC: 108 MG/DL
LYMPHOCYTES # BLD: 1.7 K/MCL (ref 1–4.8)
LYMPHOCYTES NFR BLD: 28 %
MCH RBC QN AUTO: 30.3 PG (ref 26–34)
MCHC RBC AUTO-ENTMCNC: 33 G/DL (ref 32–36.5)
MCV RBC AUTO: 91.9 FL (ref 78–100)
MEV IGG SER IA-ACNC: >300 AU/ML
MEV IGG SER QL IA: NORMAL
MONOCYTES # BLD: 0.4 K/MCL (ref 0.3–0.9)
MONOCYTES NFR BLD: 6 %
MUV IGG SER IA-ACNC: 76.4 AU/ML
MUV IGG SER QL IA: NORMAL
NEUTROPHILS # BLD: 3.8 K/MCL (ref 1.8–7.7)
NEUTROPHILS NFR BLD: 63 %
NONHDLC SERPL-MCNC: 126 MG/DL
NRBC BLD MANUAL-RTO: 0 /100 WBC
PLATELET # BLD AUTO: 257 K/MCL (ref 140–450)
POTASSIUM SERPL-SCNC: 4.7 MMOL/L (ref 3.4–5.1)
PROT SERPL-MCNC: 7 G/DL (ref 6.4–8.2)
RBC # BLD: 4.19 MIL/MCL (ref 4–5.2)
RUBV IGG SERPL IA-ACNC: 119.5 UNITS/ML
SODIUM SERPL-SCNC: 139 MMOL/L (ref 135–145)
TRIGL SERPL-MCNC: 90 MG/DL
TSH SERPL-ACNC: 1.7 MCUNITS/ML (ref 0.35–5)
VZV IGG SER QL IA: 7.13 S/CO
VZV IGG SER QL IA: NORMAL
WBC # BLD: 5.9 K/MCL (ref 4.2–11)

## 2025-08-09 LAB
GAMMA INTERFERON BACKGROUND BLD IA-ACNC: 0.02 IU/ML
M TB IFN-G BLD-IMP: NEGATIVE
M TB IFN-G CD4+ BCKGRND COR BLD-ACNC: 0.01 IU/ML
M TB IFN-G CD4+CD8+ BCKGRND COR BLD-ACNC: 0.01 IU/ML
MITOGEN IGNF BCKGRD COR BLD-ACNC: 9.4 IU/ML

## 2025-08-12 ENCOUNTER — APPOINTMENT (OUTPATIENT)
Dept: PHYSICAL THERAPY | Age: 30
End: 2025-08-12

## 2025-08-12 DIAGNOSIS — M54.50 ACUTE LEFT-SIDED LOW BACK PAIN WITHOUT SCIATICA: Primary | ICD-10-CM

## 2025-08-13 ENCOUNTER — TELEPHONE (OUTPATIENT)
Dept: FAMILY MEDICINE | Age: 30
End: 2025-08-13

## 2025-08-13 DIAGNOSIS — M53.3 SACROILIAC JOINT PAIN: ICD-10-CM

## 2025-08-13 DIAGNOSIS — M54.50 LOW BACK PAIN, UNSPECIFIED BACK PAIN LATERALITY, UNSPECIFIED CHRONICITY, UNSPECIFIED WHETHER SCIATICA PRESENT: Primary | ICD-10-CM

## 2025-08-14 ENCOUNTER — APPOINTMENT (OUTPATIENT)
Dept: OTHER | Facility: HOSPITAL | Age: 30
End: 2025-08-14
Attending: PREVENTIVE MEDICINE

## 2025-08-15 ENCOUNTER — APPOINTMENT (OUTPATIENT)
Dept: PHYSICAL THERAPY | Age: 30
End: 2025-08-15

## 2025-08-15 DIAGNOSIS — M54.50 ACUTE LEFT-SIDED LOW BACK PAIN WITHOUT SCIATICA: Primary | ICD-10-CM

## 2025-08-18 ENCOUNTER — IMAGING SERVICES (OUTPATIENT)
Dept: GENERAL RADIOLOGY | Age: 30
End: 2025-08-18
Attending: HOSPITALIST

## 2025-08-18 ENCOUNTER — APPOINTMENT (OUTPATIENT)
Dept: PHYSICAL THERAPY | Age: 30
End: 2025-08-18

## 2025-08-18 DIAGNOSIS — M54.50 LOW BACK PAIN, UNSPECIFIED BACK PAIN LATERALITY, UNSPECIFIED CHRONICITY, UNSPECIFIED WHETHER SCIATICA PRESENT: ICD-10-CM

## 2025-08-18 DIAGNOSIS — M54.50 ACUTE LEFT-SIDED LOW BACK PAIN WITHOUT SCIATICA: Primary | ICD-10-CM

## 2025-08-18 PROCEDURE — 72110 X-RAY EXAM L-2 SPINE 4/>VWS: CPT | Performed by: RADIOLOGY

## 2025-08-19 ASSESSMENT — ENCOUNTER SYMPTOMS: PAIN SEVERITY NOW: 5

## 2025-08-20 ENCOUNTER — TELEPHONE (OUTPATIENT)
Dept: PAIN MANAGEMENT | Age: 30
End: 2025-08-20

## 2025-08-21 ENCOUNTER — APPOINTMENT (OUTPATIENT)
Dept: PHYSICAL THERAPY | Age: 30
End: 2025-08-21

## 2025-08-21 ENCOUNTER — TELEPHONE (OUTPATIENT)
Dept: FAMILY MEDICINE | Age: 30
End: 2025-08-21

## 2025-08-21 DIAGNOSIS — M54.50 ACUTE LEFT-SIDED LOW BACK PAIN WITHOUT SCIATICA: Primary | ICD-10-CM

## 2025-08-26 ENCOUNTER — APPOINTMENT (OUTPATIENT)
Dept: PHYSICAL THERAPY | Age: 30
End: 2025-08-26

## 2025-08-26 DIAGNOSIS — M54.50 ACUTE LEFT-SIDED LOW BACK PAIN WITHOUT SCIATICA: Primary | ICD-10-CM

## 2025-08-27 ENCOUNTER — E-ADVICE (OUTPATIENT)
Dept: FAMILY MEDICINE | Age: 30
End: 2025-08-27

## 2025-08-27 DIAGNOSIS — M54.50 LOW BACK PAIN, UNSPECIFIED BACK PAIN LATERALITY, UNSPECIFIED CHRONICITY, UNSPECIFIED WHETHER SCIATICA PRESENT: Primary | ICD-10-CM

## 2025-08-28 ENCOUNTER — APPOINTMENT (OUTPATIENT)
Dept: PHYSICAL THERAPY | Age: 30
End: 2025-08-28

## 2025-08-28 DIAGNOSIS — M54.50 ACUTE LEFT-SIDED LOW BACK PAIN WITHOUT SCIATICA: Primary | ICD-10-CM

## 2025-09-04 ENCOUNTER — APPOINTMENT (OUTPATIENT)
Dept: PHYSICAL THERAPY | Age: 30
End: 2025-09-04

## 2025-09-11 ENCOUNTER — APPOINTMENT (OUTPATIENT)
Dept: PHYSICAL THERAPY | Age: 30
End: 2025-09-11

## 2025-09-11 DIAGNOSIS — M54.50 ACUTE LEFT-SIDED LOW BACK PAIN WITHOUT SCIATICA: Primary | ICD-10-CM

## 2025-09-15 ENCOUNTER — APPOINTMENT (OUTPATIENT)
Dept: FAMILY MEDICINE | Age: 30
End: 2025-09-15
Attending: HOSPITALIST

## 2025-09-25 ENCOUNTER — APPOINTMENT (OUTPATIENT)
Dept: OBGYN | Age: 30
End: 2025-09-25

## 2025-10-03 ENCOUNTER — APPOINTMENT (OUTPATIENT)
Dept: FAMILY MEDICINE | Age: 30
End: 2025-10-03

## (undated) NOTE — MR AVS SNAPSHOT
After Visit Summary   8/3/2021    Tone Braxton    MRN: [id number]           Visit Information     Date & Time  8/3/2021  9:30 AM Provider  Horacio Perera [address], [address] [facility]  Dept.  Phone  [phone] Annual.                  [facility] now offers Video Visits through [address] for adult and pediatric patients. Video Visits are available Monday - Friday for many common conditions such as allergies, colds, cough, fever, rash, sore throat, headache and pink eye. 4:00 pm     [address]   Monday – Friday  4:00 pm – 10:00 pm   Saturday – Sunday  10:00 am – 4:00 pm  WALK-IN CARE  Emergency Medicine Providers  Conditions needing urgent attention, but are   non-life-threatening.     Also available by appointment Average